# Patient Record
Sex: MALE | Race: ASIAN | NOT HISPANIC OR LATINO
[De-identification: names, ages, dates, MRNs, and addresses within clinical notes are randomized per-mention and may not be internally consistent; named-entity substitution may affect disease eponyms.]

---

## 2019-09-23 PROBLEM — Z00.00 ENCOUNTER FOR PREVENTIVE HEALTH EXAMINATION: Status: ACTIVE | Noted: 2019-09-23

## 2019-10-01 ENCOUNTER — APPOINTMENT (OUTPATIENT)
Dept: GASTROENTEROLOGY | Facility: HOSPITAL | Age: 59
End: 2019-10-01

## 2019-10-01 ENCOUNTER — RESULT REVIEW (OUTPATIENT)
Age: 59
End: 2019-10-01

## 2019-10-01 ENCOUNTER — OUTPATIENT (OUTPATIENT)
Dept: OUTPATIENT SERVICES | Facility: HOSPITAL | Age: 59
LOS: 1 days | End: 2019-10-01
Payer: MEDICAID

## 2019-10-01 ENCOUNTER — APPOINTMENT (OUTPATIENT)
Dept: SURGICAL ONCOLOGY | Facility: CLINIC | Age: 59
End: 2019-10-01
Payer: MEDICAID

## 2019-10-01 VITALS
HEART RATE: 56 BPM | DIASTOLIC BLOOD PRESSURE: 79 MMHG | RESPIRATION RATE: 16 BRPM | BODY MASS INDEX: 25.6 KG/M2 | OXYGEN SATURATION: 97 % | WEIGHT: 189 LBS | HEIGHT: 72 IN | SYSTOLIC BLOOD PRESSURE: 135 MMHG

## 2019-10-01 DIAGNOSIS — F17.210 NICOTINE DEPENDENCE, CIGARETTES, UNCOMPLICATED: ICD-10-CM

## 2019-10-01 DIAGNOSIS — C16.9 MALIGNANT NEOPLASM OF STOMACH, UNSPECIFIED: ICD-10-CM

## 2019-10-01 DIAGNOSIS — Z87.19 PERSONAL HISTORY OF OTHER DISEASES OF THE DIGESTIVE SYSTEM: ICD-10-CM

## 2019-10-01 DIAGNOSIS — Z86.69 PERSONAL HISTORY OF OTHER DISEASES OF THE NERVOUS SYSTEM AND SENSE ORGANS: ICD-10-CM

## 2019-10-01 DIAGNOSIS — Z80.0 FAMILY HISTORY OF MALIGNANT NEOPLASM OF DIGESTIVE ORGANS: ICD-10-CM

## 2019-10-01 PROCEDURE — 88312 SPECIAL STAINS GROUP 1: CPT

## 2019-10-01 PROCEDURE — 43239 EGD BIOPSY SINGLE/MULTIPLE: CPT | Mod: GC

## 2019-10-01 PROCEDURE — 43239 EGD BIOPSY SINGLE/MULTIPLE: CPT

## 2019-10-01 PROCEDURE — 99205 OFFICE O/P NEW HI 60 MIN: CPT

## 2019-10-01 PROCEDURE — 88305 TISSUE EXAM BY PATHOLOGIST: CPT

## 2019-10-01 PROCEDURE — 88305 TISSUE EXAM BY PATHOLOGIST: CPT | Mod: 26

## 2019-10-01 PROCEDURE — 43237 ENDOSCOPIC US EXAM ESOPH: CPT

## 2019-10-01 PROCEDURE — 43259 EGD US EXAM DUODENUM/JEJUNUM: CPT | Mod: GC

## 2019-10-01 PROCEDURE — 88312 SPECIAL STAINS GROUP 1: CPT | Mod: 26

## 2019-10-02 PROBLEM — F17.210 CIGARETTE SMOKER: Status: ACTIVE | Noted: 2019-10-02

## 2019-10-02 PROBLEM — C16.9 GASTRIC ADENOCARCINOMA: Status: ACTIVE | Noted: 2019-09-23

## 2019-10-02 PROBLEM — Z87.19 HISTORY OF GASTRIC ULCER: Status: RESOLVED | Noted: 2019-10-01 | Resolved: 2019-10-02

## 2019-10-02 PROBLEM — Z80.0 FAMILY HISTORY OF LIVER CANCER: Status: ACTIVE | Noted: 2019-10-02

## 2019-10-02 PROBLEM — Z86.69 HISTORY OF EYE PROBLEM: Status: RESOLVED | Noted: 2019-10-01 | Resolved: 2019-10-02

## 2019-10-09 ENCOUNTER — TRANSCRIPTION ENCOUNTER (OUTPATIENT)
Age: 59
End: 2019-10-09

## 2019-10-10 ENCOUNTER — OUTPATIENT (OUTPATIENT)
Dept: OUTPATIENT SERVICES | Facility: HOSPITAL | Age: 59
LOS: 1 days | End: 2019-10-10
Payer: MEDICAID

## 2019-10-10 ENCOUNTER — RESULT REVIEW (OUTPATIENT)
Age: 59
End: 2019-10-10

## 2019-10-10 DIAGNOSIS — C16.9 MALIGNANT NEOPLASM OF STOMACH, UNSPECIFIED: ICD-10-CM

## 2019-10-10 PROCEDURE — 88321 CONSLTJ&REPRT SLD PREP ELSWR: CPT

## 2019-10-16 LAB — SURGICAL PATHOLOGY STUDY: SIGNIFICANT CHANGE UP

## 2022-02-01 ENCOUNTER — APPOINTMENT (OUTPATIENT)
Dept: SURGERY | Facility: CLINIC | Age: 62
End: 2022-02-01
Payer: MEDICAID

## 2022-02-01 VITALS
OXYGEN SATURATION: 95 % | HEART RATE: 69 BPM | WEIGHT: 192 LBS | SYSTOLIC BLOOD PRESSURE: 155 MMHG | DIASTOLIC BLOOD PRESSURE: 83 MMHG | BODY MASS INDEX: 26.01 KG/M2 | TEMPERATURE: 98.5 F | HEIGHT: 72 IN

## 2022-02-01 PROCEDURE — 99406 BEHAV CHNG SMOKING 3-10 MIN: CPT

## 2022-02-01 PROCEDURE — 99205 OFFICE O/P NEW HI 60 MIN: CPT

## 2022-02-03 NOTE — PHYSICAL EXAM
[Respiratory Effort] : normal respiratory effort [Normal Rate and Rhythm] : normal rate and rhythm [Alert] : alert [Oriented to Person] : oriented to person [Oriented to Place] : oriented to place [Oriented to Time] : oriented to time [Calm] : calm [de-identified] : NAD [de-identified] : No submandibular/supraclavicular/infraclavicular lymphadenopathy [de-identified] : Soft, non-distended, no palpable masses, non-TTP in all quadrants, no rebound/guarding [de-identified] : No skin changes/discrete masses to the abdominal wall

## 2022-02-03 NOTE — HISTORY OF PRESENT ILLNESS
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 At the time he was seen by Dr. Meyers from surgical oncology and was offered resection, however the patient did not agree to surgery at the time. 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 Patient states that he has had dyspepsia, but denies fatigue, no weight loss, no loss of appetite, no N/V, otherwise is tolerating diet well. He smokes 1 pack per day x 40 years and drinks EtOH socially, denies drug use.

## 2022-02-03 NOTE — ASSESSMENT
[FreeTextEntry1] : 61 year old male with diagnosis of gastric cancer since 2019, previously refusing surgery, now willing to receive therapy, with endoscopy from 1/8/2022 demonstrating gastric adenocarcinoma at antrum and body

## 2022-02-03 NOTE — PLAN
[FreeTextEntry1] : \par - I spoke at length with the patient regarding the diagnosis and that restaging workup will need to be performed prior before there can be consideration of surgical therapy\par - I spoke with Dr. Giuliano Corea (Oncology), whom the patient will see for restaging workup as well as possible planning for chemotherapy\par - In the setting that the patient is a candidate for chemotherapy, I discussed the indications, risks, benefits, and alternatives of a chemoport placement, to which the patient stated he understood, gave consent, and all his questions were answered\par - Patient to follow with me after seeing Dr. Corea\par - I also strongly advised the patient to quit smoking, not only for the health benefts, but also for improved post surgical healing and recovery

## 2024-01-25 ENCOUNTER — NON-APPOINTMENT (OUTPATIENT)
Age: 64
End: 2024-01-25

## 2024-01-26 ENCOUNTER — APPOINTMENT (OUTPATIENT)
Dept: OTOLARYNGOLOGY | Facility: CLINIC | Age: 64
End: 2024-01-26
Payer: MEDICAID

## 2024-01-26 VITALS
BODY MASS INDEX: 25.77 KG/M2 | HEART RATE: 108 BPM | DIASTOLIC BLOOD PRESSURE: 78 MMHG | OXYGEN SATURATION: 98 % | WEIGHT: 180 LBS | HEIGHT: 70 IN | SYSTOLIC BLOOD PRESSURE: 126 MMHG

## 2024-01-26 PROCEDURE — 99214 OFFICE O/P EST MOD 30 MIN: CPT

## 2024-01-26 RX ORDER — RANITIDINE HYDROCHLORIDE 300 MG/1
TABLET, FILM COATED ORAL
Refills: 0 | Status: COMPLETED | COMMUNITY
End: 2024-01-26

## 2024-01-26 NOTE — PHYSICAL EXAM
[Midline] : trachea located in midline position [Normal] : no rashes [FreeTextEntry2] : 1 cm subcutaneous mass of the posterior scalp suggestive of a cyst

## 2024-01-26 NOTE — HISTORY OF PRESENT ILLNESS
[de-identified] : Pt referred by Dr. Rios for a 1.5cm round mass mid posterior vertex scalp present for a few years.  PT denies pain and discharge. No scans or biopsy done.

## 2024-01-26 NOTE — CONSULT LETTER
[Dear  ___] : Dear  [unfilled], [Consult Letter:] : I had the pleasure of evaluating your patient, [unfilled]. [Please see my note below.] : Please see my note below. [Sincerely,] : Sincerely, [Consult Closing:] : Thank you very much for allowing me to participate in the care of this patient.  If you have any questions, please do not hesitate to contact me. [FreeTextEntry2] : Dr Theodore Rios [FreeTextEntry3] :  Miguelito Mendez MD, FACS  Otolaryngology-Head and Neck Surgery [place] [employer] [facility]

## 2024-01-26 NOTE — PLAN
[TextEntry] : - 1 cm scalp mass suggestive of cyst. - Patient wants to have it removed and I recommended resection in the office under local anesthesia.  - Risks and complications of the procedure discussed today.

## 2024-05-02 ENCOUNTER — APPOINTMENT (OUTPATIENT)
Dept: OTOLARYNGOLOGY | Facility: CLINIC | Age: 64
End: 2024-05-02
Payer: MEDICAID

## 2024-05-02 ENCOUNTER — LABORATORY RESULT (OUTPATIENT)
Age: 64
End: 2024-05-02

## 2024-05-02 VITALS
WEIGHT: 185 LBS | DIASTOLIC BLOOD PRESSURE: 80 MMHG | HEIGHT: 70 IN | BODY MASS INDEX: 26.48 KG/M2 | SYSTOLIC BLOOD PRESSURE: 142 MMHG | HEART RATE: 67 BPM

## 2024-05-02 DIAGNOSIS — R22.0 LOCALIZED SWELLING, MASS AND LUMP, HEAD: ICD-10-CM

## 2024-05-02 PROCEDURE — 11422 EXC H-F-NK-SP B9+MARG 1.1-2: CPT

## 2024-05-02 PROCEDURE — 99212 OFFICE O/P EST SF 10 MIN: CPT | Mod: 25

## 2024-05-02 NOTE — HISTORY OF PRESENT ILLNESS
[de-identified] : 63 yro pt was referred by Dr. Rios for a 1.5cm round mass mid posterior vertex scalp present for a few years.  PT denies pain, discharge, or bleeding. No changes since last visit, feeling well.  No scans or biopsy done. Here for removal in office.

## 2024-05-02 NOTE — CONSULT LETTER
[Dear  ___] : Dear  [unfilled], [Consult Letter:] : I had the pleasure of evaluating your patient, [unfilled]. [Please see my note below.] : Please see my note below. [Consult Closing:] : Thank you very much for allowing me to participate in the care of this patient.  If you have any questions, please do not hesitate to contact me. [Sincerely,] : Sincerely, [FreeTextEntry2] : Dr Theodore Rios [FreeTextEntry3] :  Miguelito Mendez MD, FACS  Otolaryngology-Head and Neck Surgery [place] [employer] at [facility]

## 2024-05-02 NOTE — PLAN
[TextEntry] : - 1 cm scalp mass suggestive of cyst. - Patient wants to have it removed and I recommended resection in the office under local anesthesia. Procedure performed today with no immediate complications. - Return in 3 weeks.

## 2024-05-20 NOTE — HISTORY OF PRESENT ILLNESS
[de-identified] : 63 yro pt was referred by Dr. Rios for a 1.5cm round mass mid posterior vertex scalp present for a few years. s/p scalp lesion removal in office on 5/2/2024.    Path 5/2/2024: Specimen(s) Submitted 1. Posterior scalp lesion  Final Diagnosis Posterior scalp lesion, excision      - Epidermal cyst

## 2024-05-23 ENCOUNTER — APPOINTMENT (OUTPATIENT)
Dept: OTOLARYNGOLOGY | Facility: CLINIC | Age: 64
End: 2024-05-23

## 2024-05-23 NOTE — CONSULT LETTER
[Dear  ___] : Dear  [unfilled], [Consult Letter:] : I had the pleasure of evaluating your patient, [unfilled]. [Please see my note below.] : Please see my note below. [Consult Closing:] : Thank you very much for allowing me to participate in the care of this patient.  If you have any questions, please do not hesitate to contact me. [Sincerely,] : Sincerely, [FreeTextEntry2] : Dr Theodore Rios [FreeTextEntry3] :  Miguelito Mendez MD, FACS  Otolaryngology-Head and Neck Surgery [place] [employer] at [facility]

## 2024-07-05 ENCOUNTER — APPOINTMENT (OUTPATIENT)
Dept: SURGERY | Facility: CLINIC | Age: 64
End: 2024-07-05
Payer: MEDICAID

## 2024-07-05 VITALS
SYSTOLIC BLOOD PRESSURE: 128 MMHG | WEIGHT: 188 LBS | RESPIRATION RATE: 18 BRPM | BODY MASS INDEX: 26.98 KG/M2 | DIASTOLIC BLOOD PRESSURE: 68 MMHG | TEMPERATURE: 97.5 F | OXYGEN SATURATION: 98 % | HEART RATE: 64 BPM

## 2024-07-05 PROCEDURE — 99214 OFFICE O/P EST MOD 30 MIN: CPT

## 2024-08-07 ENCOUNTER — APPOINTMENT (OUTPATIENT)
Dept: GASTROENTEROLOGY | Facility: CLINIC | Age: 64
End: 2024-08-07

## 2024-08-07 ENCOUNTER — APPOINTMENT (OUTPATIENT)
Dept: NUCLEAR MEDICINE | Facility: CLINIC | Age: 64
End: 2024-08-07

## 2024-08-20 ENCOUNTER — RESULT REVIEW (OUTPATIENT)
Age: 64
End: 2024-08-20

## 2024-08-20 ENCOUNTER — APPOINTMENT (OUTPATIENT)
Dept: NUCLEAR MEDICINE | Facility: CLINIC | Age: 64
End: 2024-08-20

## 2024-08-20 PROCEDURE — A9552: CPT

## 2024-08-20 PROCEDURE — 78815 PET IMAGE W/CT SKULL-THIGH: CPT | Mod: PI

## 2024-08-21 ENCOUNTER — APPOINTMENT (OUTPATIENT)
Dept: GASTROENTEROLOGY | Facility: CLINIC | Age: 64
End: 2024-08-21
Payer: MEDICAID

## 2024-08-21 VITALS
HEART RATE: 62 BPM | DIASTOLIC BLOOD PRESSURE: 83 MMHG | OXYGEN SATURATION: 99 % | HEIGHT: 67.72 IN | WEIGHT: 186 LBS | SYSTOLIC BLOOD PRESSURE: 154 MMHG | BODY MASS INDEX: 28.52 KG/M2

## 2024-08-21 DIAGNOSIS — Z84.1 FAMILY HISTORY OF DISORDERS OF KIDNEY AND URETER: ICD-10-CM

## 2024-08-21 DIAGNOSIS — C16.9 MALIGNANT NEOPLASM OF STOMACH, UNSPECIFIED: ICD-10-CM

## 2024-08-21 DIAGNOSIS — Z56.0 UNEMPLOYMENT, UNSPECIFIED: ICD-10-CM

## 2024-08-21 PROCEDURE — 99204 OFFICE O/P NEW MOD 45 MIN: CPT

## 2024-08-21 SDOH — ECONOMIC STABILITY - INCOME SECURITY: UNEMPLOYMENT, UNSPECIFIED: Z56.0

## 2024-08-21 NOTE — ASSESSMENT
[FreeTextEntry1] : 63M with pmhx of known gastric cancer presenting for evaluation. Pt had prior EUS in 2019 showing a gastric antral lesion which appeared mucosal with biopsies showing poorly differentiated adenocarcinoma. Recent PET with gastric wall thickening. Of note, pt with poor compliance and poor follow-up and despite recommendations for further evaluation for his cancer, did not follow-up with his surgeon or med onc. Now referred again for repeat EGD and EUS to reevaluate and localized staging now that it has been >5 yrs since his initial diagnosis. Denies any other complaints, no abd pain, n/v/d/c, melena, hematochezia, fever/chills, jaundice, dark urine, or other issues. - Schedule EGD/EUS.  - Instructed to follow-up with surgery and med onc as planned.

## 2024-08-21 NOTE — HISTORY OF PRESENT ILLNESS
[FreeTextEntry1] : 63M with pmhx of known gastric cancer presenting for evaluation. Pt had prior EUS in 2019 showing a gastric antral lesion which appeared mucosal with biopsies showing poorly differentiated adenocarcinoma. Recent PET with gastric wall thickening. Of note, pt with poor compliance and poor follow-up and despite recommendations for further evaluation for his cancer, did not follow-up with his surgeon or med onc. Now referred again for repeat EGD and EUS to reevaluate and localized staging now that it has been >5 yrs since his initial diagnosis. Denies any other complaints, no abd pain, n/v/d/c, melena, hematochezia, fever/chills, jaundice, dark urine, or other issues.

## 2024-08-21 NOTE — PHYSICAL EXAM

## 2024-08-27 ENCOUNTER — APPOINTMENT (OUTPATIENT)
Dept: SURGERY | Facility: CLINIC | Age: 64
End: 2024-08-27
Payer: MEDICAID

## 2024-08-27 VITALS
BODY MASS INDEX: 29.35 KG/M2 | HEART RATE: 62 BPM | SYSTOLIC BLOOD PRESSURE: 136 MMHG | WEIGHT: 187 LBS | HEIGHT: 67 IN | TEMPERATURE: 98.2 F | DIASTOLIC BLOOD PRESSURE: 77 MMHG | OXYGEN SATURATION: 97 %

## 2024-08-27 PROCEDURE — 99213 OFFICE O/P EST LOW 20 MIN: CPT

## 2024-08-27 NOTE — PHYSICAL EXAM
[Respiratory Effort] : normal respiratory effort [Normal Rate and Rhythm] : normal rate and rhythm [Alert] : alert [Oriented to Person] : oriented to person [Oriented to Place] : oriented to place [Oriented to Time] : oriented to time [Calm] : calm [de-identified] : NAD [de-identified] : Soft, non-distended, non-TTP in all quadrants, no rebound/guarding [de-identified] : No skin changes

## 2024-08-27 NOTE — HISTORY OF PRESENT ILLNESS
[de-identified] : [place] translation via [employer]  Since last seen, patient was seen by Dr. Coy Ayala (GI) and Dr. Giuliano Corea (Oncology), and is scheduled for EUS/EGD on 9/4. Patient underwent PET CT on 8/20/2024 which demonstrated thickened distal stomach with narrowed lumen, without focally abnormal uptake. Patient does report some discomfort in the epigastric region, but otherwise states that he is tolerating diet, denies N/V, having normal bowel function.

## 2024-08-27 NOTE — PLAN
[FreeTextEntry1] : - I reviewed the PET CT findings with the patient  - I will follow with patient again after EUS is performed - Patient was agreeable with this plan and all his questions were answered to his apparent satisfaction

## 2024-08-27 NOTE — PHYSICAL EXAM
[Respiratory Effort] : normal respiratory effort [Normal Rate and Rhythm] : normal rate and rhythm [Alert] : alert [Oriented to Person] : oriented to person [Oriented to Place] : oriented to place [Oriented to Time] : oriented to time [Calm] : calm [de-identified] : NAD [de-identified] : Soft, non-distended, non-TTP in all quadrants, no rebound/guarding [de-identified] : No skin changes

## 2024-08-27 NOTE — HISTORY OF PRESENT ILLNESS
[de-identified] : Kyrgyz translation via [employer] L[employer]  Since last seen, patient was seen by Dr. Coy Ayala (GI) and Dr. Giuliano Corea (Oncology), and is scheduled for EUS/EGD on 9/4. Patient underwent PET CT on 8/20/2024 which demonstrated thickened distal stomach with narrowed lumen, without focally abnormal uptake. Patient does report some discomfort in the epigastric region, but otherwise states that he is tolerating diet, denies N/V, having normal bowel function.

## 2024-08-29 ENCOUNTER — APPOINTMENT (OUTPATIENT)
Dept: CARDIOLOGY | Facility: CLINIC | Age: 64
End: 2024-08-29

## 2024-09-04 ENCOUNTER — RESULT REVIEW (OUTPATIENT)
Age: 64
End: 2024-09-04

## 2024-09-04 ENCOUNTER — TRANSCRIPTION ENCOUNTER (OUTPATIENT)
Age: 64
End: 2024-09-04

## 2024-09-04 ENCOUNTER — APPOINTMENT (OUTPATIENT)
Dept: GASTROENTEROLOGY | Facility: HOSPITAL | Age: 64
End: 2024-09-04

## 2024-09-24 ENCOUNTER — APPOINTMENT (OUTPATIENT)
Dept: SURGERY | Facility: CLINIC | Age: 64
End: 2024-09-24
Payer: MEDICAID

## 2024-09-24 PROCEDURE — 99214 OFFICE O/P EST MOD 30 MIN: CPT

## 2024-09-24 NOTE — PHYSICAL EXAM
[Respiratory Effort] : normal respiratory effort [Normal Rate and Rhythm] : normal rate and rhythm [Alert] : alert [Oriented to Person] : oriented to person [Oriented to Place] : oriented to place [Oriented to Time] : oriented to time [Calm] : calm [de-identified] : NAD [de-identified] : Soft, non-distended, non-TTP in all quadrants, no rebound/guarding [de-identified] : No skin changes to abdominal wall

## 2024-09-24 NOTE — PLAN
[FreeTextEntry1] : - I spoke at length with the patient regarding the findings, and offered the patient chemoport placement and diagnostic laparoscopy with washings - We discussed the indications, risks (including but not limited to bleeding, infection, injury to nearby anatomic structures, pain), benefits, and alternatives, to which the patient stated that he understood, gave consent, and all his questions were answered to his apparent satisfaction - Patient to follow with his PCP for medical risk stratification

## 2024-09-24 NOTE — HISTORY OF PRESENT ILLNESS
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 Coy Ayala with biopsies demonstrating poorly differentiated adenocarcinoma found to be a T3 lesion. Patient also seen by Dr. Giuliano Corea who has set up for patient to receive chemotherapy on 9/30. Planned for chemoport placement and diagnostic laparoscopy with washings tomorrow.

## 2024-09-24 NOTE — ASSESSMENT
[FreeTextEntry1] : 64 year old male with known gastric CA with patient initially unwilling to undergo treatment, returning with repeat EUS and biopsies showing T3 lesion with poorly differentiated adenocarcinoma and planned for chemoport placement, diagnostic laparoscopy

## 2024-09-25 ENCOUNTER — APPOINTMENT (OUTPATIENT)
Dept: SURGERY | Facility: HOSPITAL | Age: 64
End: 2024-09-25